# Patient Record
Sex: MALE | Race: WHITE | Employment: FULL TIME | URBAN - METROPOLITAN AREA
[De-identification: names, ages, dates, MRNs, and addresses within clinical notes are randomized per-mention and may not be internally consistent; named-entity substitution may affect disease eponyms.]

---

## 2017-03-09 ENCOUNTER — TELEPHONE (OUTPATIENT)
Dept: FAMILY MEDICINE | Facility: CLINIC | Age: 32
End: 2017-03-09

## 2017-03-09 NOTE — LETTER
March 27, 2017          Dane Lord  [address]  [facility] [address]            Dear Dane Lord,      At [facility] we care about your health and are committed to providing quality patient care. Regular appointments are a vital part of the care and management of your health and can help prevent many of the complications that can occur.      It has come to our attention that you are due for Diabetes check.  Please call [facility] at [phone] soon to schedule your follow up appointment.    If you have transferred care to another clinic please call to inform us so that we do not continue to send you reminder letters.      Sincerely,      [facility] Care Team/juana

## 2017-03-09 NOTE — TELEPHONE ENCOUNTER
Panel Management Review      Patient has the following on his problem list:     Diabetes    ASA: none listed     Last A1C  Lab Results   Component Value Date    A1C 13.1 03/15/2016    A1C 6.7 07/17/2014    A1C 7.2 03/04/2014     A1C tested: failed    Last LDL:    No results found for: CHOL  No results found for: HDL  No results found for: LDL  No results found for: TRIG  No results found for: CHOLHDLRATIO  No results found for: NHDL    Is the patient on a Statin? NO             Is the patient on Aspirin? NO        Last three blood pressure readings:  BP Readings from Last 3 Encounters:   03/15/16 129/83   03/04/14 109/74       Date of last diabetes office visit: 3/15/2016     Tobacco History:     History   Smoking Status     Never Smoker   Smokeless Tobacco     Never Used           Composite cancer screening  Chart review shows that this patient is due/due soon for the following None  Summary:    Patient is due/failing the following:   A1C    Action needed:   Patient needs office visit for Diabetes check.    Type of outreach:    Phone, spoke to patient.   , left message to call clinic for Diabetes check.    Questions for provider review:    None                                                                                                                                    Lanie Burris [facility]

## 2017-03-24 DIAGNOSIS — F31.70 BIPOLAR DISORDER IN REMISSION (H): ICD-10-CM

## 2017-03-24 NOTE — TELEPHONE ENCOUNTER
FLUoxetine (PROZAC) 20 MG capsule     Last Written Prescription Date: 3/15/16  Last Fill Quantity: 90, # refills: 3  Last Office Visit with [facility] primary care provider:  3/15/16        Last PHQ-9 score on record= No flowsheet data found.

## 2017-03-28 DIAGNOSIS — F31.70 BIPOLAR DISORDER IN REMISSION (H): ICD-10-CM

## 2017-03-28 NOTE — TELEPHONE ENCOUNTER
Medication is being filled for 1 time luis alberto refill only due to:  Patient due to be seen in clinic for provider visit.    Jason Archer RN

## 2017-03-28 NOTE — TELEPHONE ENCOUNTER
Filled 3/27/17.    Medication is being filled for 1 time luis alberto refill only due to:  Patient due to be seen in clinic for provider visit.          Gabriel Perez  [facility]

## 2017-03-31 NOTE — TELEPHONE ENCOUNTER
Prozac 20 mg     Last Written Prescription Date: 03/27/17  Last Fill Quantity: 30, # refills: 0  Last Office Visit with [facility] primary care provider:  03/15/16        Last PHQ-9 score on record= No flowsheet data found.            Medication was filled 03/27/17.  Request denied.      Abby Good RN

## 2017-09-20 ENCOUNTER — OFFICE VISIT (OUTPATIENT)
Dept: FAMILY MEDICINE | Facility: CLINIC | Age: 32
End: 2017-09-20
Payer: MEDICAID

## 2017-09-20 VITALS
DIASTOLIC BLOOD PRESSURE: 70 MMHG | WEIGHT: 209.6 LBS | HEART RATE: 68 BPM | HEIGHT: 69 IN | BODY MASS INDEX: 31.04 KG/M2 | TEMPERATURE: 97.5 F | OXYGEN SATURATION: 98 % | SYSTOLIC BLOOD PRESSURE: 106 MMHG

## 2017-09-20 DIAGNOSIS — E11.9 TYPE 2 DIABETES MELLITUS WITHOUT COMPLICATION, WITH LONG-TERM CURRENT USE OF INSULIN (H): ICD-10-CM

## 2017-09-20 DIAGNOSIS — F31.70 BIPOLAR DISORDER IN REMISSION (H): Primary | ICD-10-CM

## 2017-09-20 DIAGNOSIS — Z79.4 TYPE 2 DIABETES MELLITUS WITHOUT COMPLICATION, WITH LONG-TERM CURRENT USE OF INSULIN (H): ICD-10-CM

## 2017-09-20 DIAGNOSIS — Z23 ENCOUNTER FOR IMMUNIZATION: ICD-10-CM

## 2017-09-20 LAB
ALBUMIN SERPL-MCNC: 3.6 G/DL (ref 3.4–5)
ALP SERPL-CCNC: 61 U/L (ref 40–150)
ALT SERPL W P-5'-P-CCNC: 32 U/L (ref 0–70)
ANION GAP SERPL CALCULATED.3IONS-SCNC: 8 MMOL/L (ref 3–14)
AST SERPL W P-5'-P-CCNC: 12 U/L (ref 0–45)
BILIRUB SERPL-MCNC: 0.5 MG/DL (ref 0.2–1.3)
BUN SERPL-MCNC: 12 MG/DL (ref 7–30)
CALCIUM SERPL-MCNC: 8.3 MG/DL (ref 8.5–10.1)
CHLORIDE SERPL-SCNC: 100 MMOL/L (ref 94–109)
CO2 SERPL-SCNC: 27 MMOL/L (ref 20–32)
CREAT SERPL-MCNC: 1.43 MG/DL (ref 0.66–1.25)
CREAT UR-MCNC: 97 MG/DL
ERYTHROCYTE [DISTWIDTH] IN BLOOD BY AUTOMATED COUNT: 12.4 % (ref 10–15)
GFR SERPL CREATININE-BSD FRML MDRD: 57 ML/MIN/1.7M2
GLUCOSE SERPL-MCNC: 373 MG/DL (ref 70–99)
HBA1C MFR BLD: 11.6 % (ref 4.3–6)
HCT VFR BLD AUTO: 41.5 % (ref 40–53)
HGB BLD-MCNC: 14 G/DL (ref 13.3–17.7)
LDLC SERPL DIRECT ASSAY-MCNC: 84 MG/DL
LITHIUM SERPL-SCNC: 1 MMOL/L (ref 0.6–1.2)
MCH RBC QN AUTO: 31.4 PG (ref 26.5–33)
MCHC RBC AUTO-ENTMCNC: 33.7 G/DL (ref 31.5–36.5)
MCV RBC AUTO: 93 FL (ref 78–100)
MICROALBUMIN UR-MCNC: <5 MG/L
MICROALBUMIN/CREAT UR: NORMAL MG/G CR (ref 0–17)
PLATELET # BLD AUTO: 280 10E9/L (ref 150–450)
POTASSIUM SERPL-SCNC: 4.2 MMOL/L (ref 3.4–5.3)
PROT SERPL-MCNC: 6.7 G/DL (ref 6.8–8.8)
RBC # BLD AUTO: 4.46 10E12/L (ref 4.4–5.9)
SODIUM SERPL-SCNC: 135 MMOL/L (ref 133–144)
TSH SERPL DL<=0.005 MIU/L-ACNC: 0.54 MU/L (ref 0.4–4)
WBC # BLD AUTO: 10 10E9/L (ref 4–11)

## 2017-09-20 PROCEDURE — 80053 COMPREHEN METABOLIC PANEL: CPT | Performed by: FAMILY MEDICINE

## 2017-09-20 PROCEDURE — 84443 ASSAY THYROID STIM HORMONE: CPT | Performed by: FAMILY MEDICINE

## 2017-09-20 PROCEDURE — 83721 ASSAY OF BLOOD LIPOPROTEIN: CPT | Performed by: FAMILY MEDICINE

## 2017-09-20 PROCEDURE — 90471 IMMUNIZATION ADMIN: CPT | Performed by: FAMILY MEDICINE

## 2017-09-20 PROCEDURE — 83036 HEMOGLOBIN GLYCOSYLATED A1C: CPT | Performed by: FAMILY MEDICINE

## 2017-09-20 PROCEDURE — 82043 UR ALBUMIN QUANTITATIVE: CPT | Performed by: FAMILY MEDICINE

## 2017-09-20 PROCEDURE — 36415 COLL VENOUS BLD VENIPUNCTURE: CPT | Performed by: FAMILY MEDICINE

## 2017-09-20 PROCEDURE — 99213 OFFICE O/P EST LOW 20 MIN: CPT | Mod: 25 | Performed by: FAMILY MEDICINE

## 2017-09-20 PROCEDURE — 99207 C FOOT EXAM  NO CHARGE: CPT | Performed by: FAMILY MEDICINE

## 2017-09-20 PROCEDURE — 90686 IIV4 VACC NO PRSV 0.5 ML IM: CPT | Performed by: FAMILY MEDICINE

## 2017-09-20 PROCEDURE — 80178 ASSAY OF LITHIUM: CPT | Performed by: FAMILY MEDICINE

## 2017-09-20 PROCEDURE — 85027 COMPLETE CBC AUTOMATED: CPT | Performed by: FAMILY MEDICINE

## 2017-09-20 RX ORDER — LITHIUM CARBONATE 300 MG/1
TABLET, FILM COATED, EXTENDED RELEASE ORAL
Qty: 270 TABLET | Refills: 3 | Status: SHIPPED | OUTPATIENT
Start: 2017-09-20

## 2017-09-20 ASSESSMENT — PATIENT HEALTH QUESTIONNAIRE - PHQ9: SUM OF ALL RESPONSES TO PHQ QUESTIONS 1-9: 2

## 2017-09-20 NOTE — PATIENT INSTRUCTIONS
How to contact your care team providers:    [facility]  [phone]  Pharmacy [phone]    AMY Marino Dr., Dr., PA-C, Dr., PA-C    Team RN: Jason BARKER    Clinic hours  M-Th 7am-7pm   Fri 7am-5pm.   Urgent care M-F 11am-9pm,   Sat/sun 9am-5pm.  Pharmacy M-F 8:00am-8pm Sat/sun 9am-5pm.     All password changes, disabled accounts, or ID changes in [employer]/MyHealth will be done by our Access Services Department.   If you need help with your account or password, call: [phone]. Clinic staff no longer has the ability to change passwords.

## 2017-09-20 NOTE — NURSING NOTE
"Chief Complaint   Patient presents with     Depression       Initial /70 (BP Location: Left arm, Patient Position: Chair, Cuff Size: Adult Large)  Pulse 68  Temp 97.5  F (36.4  C) (Oral)  Ht 5' 9\" (1.753 m)  Wt 209 lb 9.6 oz (95.1 kg)  SpO2 98%  BMI 30.95 kg/m2 Estimated body mass index is 30.95 kg/(m^2) as calculated from the following:    Height as of this encounter: 5' 9\" (1.753 m).    Weight as of this encounter: 209 lb 9.6 oz (95.1 kg).  Medication Reconciliation: complete     Alona Bains MA    "

## 2017-09-20 NOTE — PROGRESS NOTES
"  SUBJECTIVE:   Dane Lord is a 31 year old male who presents to clinic today for the following health issues:      Depression Followup    Status since last visit: Stable     See PHQ-9 for current symptoms.  Other associated symptoms: None    Complicating factors:   Significant life event:  No   Current substance abuse:  None  Anxiety or Panic symptoms:  No    PHQ-9  English  PHQ-9   Any Language      Amount of exercise or physical activity: 3-4 days/week for an average of 15-30 minutes    Problems taking medications regularly: No    Medication side effects: none    Diet: regular (no restrictions)      Problem list and histories reviewed & adjusted, as indicated.  Additional history: as documented    Patient Active Problem List   Diagnosis     Bipolar disorder in remission (H)     Overweight (BMI 25.0-29.9)     Type 2 diabetes mellitus without complication, with long-term current use of insulin (H)     Past Surgical History:   Procedure Laterality Date     TONSILLECTOMY         Social History   Substance Use Topics     Smoking status: Never Smoker     Smokeless tobacco: Never Used     Alcohol use Yes     Family History   Problem Relation Age of Onset     DIABETES Mother              Reviewed and updated as needed this visit by clinical staffTobacco  Allergies  Meds  Med Hx  Surg Hx  Fam Hx  Soc Hx      Reviewed and updated as needed this visit by Provider         ROS:  Constitutional, HEENT, cardiovascular, pulmonary, GI, , musculoskeletal, neuro, skin, endocrine and psych systems are negative, except as otherwise noted.      OBJECTIVE:   /70 (BP Location: Left arm, Patient Position: Chair, Cuff Size: Adult Large)  Pulse 68  Temp 97.5  F (36.4  C) (Oral)  Ht 5' 9\" (1.753 m)  Wt 209 lb 9.6 oz (95.1 kg)  SpO2 98%  BMI 30.95 kg/m2  Body mass index is 30.95 kg/(m^2).  GENERAL: healthy, alert and no distress  NECK: no adenopathy, no asymmetry, masses, or scars and thyroid normal to " palpation  RESP: lungs clear to auscultation - no rales, rhonchi or wheezes  CV: regular rate and rhythm, normal S1 S2, no S3 or S4, no murmur, click or rub, no peripheral edema and peripheral pulses strong  ABDOMEN: soft, nontender, no hepatosplenomegaly, no masses and bowel sounds normal  MS: no gross musculoskeletal defects noted, no edema  Diabetic foot exam: normal DP and PT pulses, no trophic changes or ulcerative lesions and normal sensory exam    Diagnostic Test Results:  none     ASSESSMENT/PLAN:     1. Bipolar disorder in remission (H)  Stable. Continue with current medications. Check routine labs. RTC in 6 months for recheck.  - FLUoxetine (PROZAC) 20 MG capsule; TAKE 1 TABLET BY MOUTH DAILY  Dispense: 90 capsule; Refill: 1  - lithium (ESKALITH/LITHOBID) 300 MG CR tablet; TAKE 3 TABLETS BY MOUTH EVERY NIGHT AT BEDTIME  Dispense: 270 tablet; Refill: 3  - Comprehensive metabolic panel (BMP + Alb, Alk Phos, ALT, AST, Total. Bili, TP)  - TSH with free T4 reflex  - CBC with platelets  - Lithium level    2. Type 2 diabetes mellitus without complication, with long-term current use of insulin (H)  Check labs. Patient stated he is seeing an Endocrinologist.  - Comprehensive metabolic panel (BMP + Alb, Alk Phos, ALT, AST, Total. Bili, TP)  - TSH with free T4 reflex  - Hemoglobin A1c  - Albumin Random Urine Quantitative with Creat Ratio  - LDL cholesterol direct    3. Encounter for immunization    - C FLU VAC QUADRIVALENT SPLIT VIRUS 3+YRS IM    Work on weight loss  Regular exercise  See Patient Instructions    Dennis Mattson MD, MD  [facility]

## 2017-09-20 NOTE — MR AVS SNAPSHOT
After Visit Summary   9/20/2017    Dane Lord    MRN: [id number]           Patient Information     Date Of Birth          1985        Visit Information        Provider Department      9/20/2017 2:00 PM Dennis Mattson MD [facility]        Today's Diagnoses     Bipolar disorder in remission (H)    -  1    Type 2 diabetes mellitus without complication, with long-term current use of insulin (H)        Encounter for immunization          Care Instructions    How to contact your care team providers:    [facility]  [phone]  Pharmacy [phone]    AMY Marino Dr., Dr., PA-C, Dr., PA-C    Team RN: Jason FRANCIS and Litzy BARKER    Clinic hours  M-Th 7am-7pm   Fri 7am-5pm.   Urgent care M-F 11am-9pm,   Sat/sun 9am-5pm.  Pharmacy M-F 8:00am-8pm Sat/sun 9am-5pm.     All password changes, disabled accounts, or ID changes in [employer]/MyHealth will be done by our Access Services Department.   If you need help with your account or password, call: [phone]. Clinic staff no longer has the ability to change passwords.                         Follow-ups after your visit        Who to contact     If you have questions or need follow up information about today's clinic visit or your schedule please contact [facility] directly at [phone].  Normal or non-critical lab and imaging results will be communicated to you by MyChart, letter or phone within 4 business days after the clinic has received the results. If you do not hear from us within 7 days, please contact the clinic through MyChart or phone. If you have a critical or abnormal lab result, we will notify you by phone as soon as possible.  Submit refill requests through [employer] or call your pharmacy and they will forward the refill request to us.  "Please allow 3 business days for your refill to be completed.          Additional Information About Your Visit        MyChart Information     [employer] lets you send messages to your doctor, view your test results, renew your prescriptions, schedule appointments and more. To sign up, go to [email or web address] . Click on \"Log in\" on the left side of the screen, which will take you to the Welcome page. Then click on \"Sign up Now\" on the right side of the page.     You will be asked to enter the access code listed below, as well as some personal information. Please follow the directions to create your username and password.     Your access code is: [email or web address]  Expires: 2017  2:29 PM     Your access code will  in 90 days. If you need help or a new code, please call your [facility] clinic or [phone].        Care EveryWhere ID     This is your Care EveryWhere ID. This could be used by other organizations to access your [facility] medical records  [id number]        Your Vitals Were     Pulse Temperature Height Pulse Oximetry BMI (Body Mass Index)       68 97.5  F (36.4  C) (Oral) 5' 9\" (1.753 m) 98% 30.95 kg/m2        Blood Pressure from Last 3 Encounters:   17 106/70   03/15/16 129/83   14 109/74    Weight from Last 3 Encounters:   17 209 lb 9.6 oz (95.1 kg)   03/15/16 189 lb (85.7 kg)   14 190 lb (86.2 kg)              We Performed the Following     Albumin Random Urine Quantitative with Creat Ratio     C FLU VAC QUADRIVALENT SPLIT VIRUS 3+YRS IM     CBC with platelets     Comprehensive metabolic panel (BMP + Alb, Alk Phos, ALT, AST, Total. Bili, TP)     Hemoglobin A1c     LDL cholesterol direct     Lithium level     TSH with free T4 reflex          Today's Medication Changes          These changes are accurate as of: 17  2:30 PM.  If you have any questions, ask your nurse or doctor.               These medicines have changed or have updated prescriptions.        " Dose/Directions    FLUoxetine 20 MG capsule   Commonly known as:  PROzac   This may have changed:  See the new instructions.   Used for:  Bipolar disorder in remission (H)   Changed by:  Dennis Mattson MD        TAKE 1 TABLET BY MOUTH DAILY   Quantity:  90 capsule   Refills:  1       LANTUS SOLOSTAR 100 UNIT/ML injection   This may have changed:  See the new instructions.   Used for:  Uncontrolled type 1 diabetes mellitus without complication (H)   Generic drug:  insulin glargine        INJECT 20 UNITS SUBCUTANEOUSLY EVERY MORNING   Quantity:  15 mL   Refills:  1       lithium 300 MG CR tablet   Commonly known as:  ESKALITH/LITHOBID   This may have changed:  See the new instructions.   Used for:  Bipolar disorder in remission (H)   Changed by:  Dennis Mattson MD        TAKE 3 TABLETS BY MOUTH EVERY NIGHT AT BEDTIME   Quantity:  270 tablet   Refills:  3            Where to get your medicines      These medications were sent to [facility] [address] [address]     Phone:  [phone]     FLUoxetine 20 MG capsule    lithium 300 MG CR tablet                Primary Care Provider Office Phone # Fax #    Rafa Calero [phone]       [address]  [place] [address]        Equal Access to Services     KATIE [facility] : Hadii bhakti carmona hadasho Soomaali, waaxda luqadaha, qaybta kaalmada adeegyada, zelalem gant. So [facility] [phone].    ATENCIÓN: Si habla español, tiene a gomez disposición servicios gratuitos de asistencia lingüística. Llame al [phone].    We comply with applicable federal civil rights laws and [place] laws. We do not discriminate on the basis of race, color, national origin, age, disability sex, sexual orientation or gender identity.            Thank you!     Thank you for choosing [facility]  for your care. Our goal is always to provide you with excellent  care. Hearing back from our patients is one way we can continue to improve our services. Please take a few minutes to complete the written survey that you may receive in the mail after your visit with us. Thank you!             Your Updated Medication List - Protect others around you: Learn how to safely use, store and throw away your medicines at www.disposemymeds.org.          This list is accurate as of: 9/20/17  2:30 PM.  Always use your most recent med list.                   Brand Name Dispense Instructions for use Diagnosis    FLUoxetine 20 MG capsule    PROzac    90 capsule    TAKE 1 TABLET BY MOUTH DAILY    Bipolar disorder in remission (H)       LANTUS SOLOSTAR 100 UNIT/ML injection   Generic drug:  insulin glargine     15 mL    INJECT 20 UNITS SUBCUTANEOUSLY EVERY MORNING    Uncontrolled type 1 diabetes mellitus without complication (H)       lithium 300 MG CR tablet    ESKALITH/LITHOBID    270 tablet    TAKE 3 TABLETS BY MOUTH EVERY NIGHT AT BEDTIME    Bipolar disorder in remission (H)       metFORMIN 750 MG 24 hr tablet    GLUCOPHAGE-XR    90 tablet    TAKE 3 TABLETS BY MOUTH DAILY WITH BREAKFAST    Type 2 diabetes mellitus without complication, unspecified long term insulin use status (H)       NOVOLOG MIX 70/30 FLEXPEN SC           triamcinolone 0.1 % cream    KENALOG    453.6 g    Apply sparingly to affected area two times daily as needed.    Psoriasis

## 2017-09-20 NOTE — NURSING NOTE
Injectable Influenza Immunization Documentation      1.  Has the patient received the information for the injectable influenza vaccine? YES    2. Is the patient 6 months of age or older? YES    3. Does the patient have any of the following contraindications?          Severe allergy to eggs? No     Severe allergic reaction to previous influenza vaccines? No     Allergy to contact lens solution/thimerosol? No     History of Guillain-Jupiter syndrome? No     Currently have moderate or severe illness? No         4.  The vaccine has been administered and the patient was instructed to wait 15 minutes before leaving the building in the event of an allergic reaction: YES    Vaccination given by Alona Bains MA

## 2017-09-20 NOTE — LETTER
September 21, 2017      Dane Lord  [address]  [place] [address]        Dear ,    We are writing to inform you of your test results.    Your diabetes is not controlled. Please follow up with your endocrinologist to help manage the diabetes. Your kidney test was mildly elevated and abnormal. Have to control the diabetes better or the kidneys will worsen. Your blood count, cholesterol, thyroid and lithium level tests were normal.     Resulted Orders   Comprehensive metabolic panel (BMP + Alb, Alk Phos, ALT, AST, Total. Bili, TP)   Result Value Ref Range    Sodium 135 133 - 144 mmol/L    Potassium 4.2 3.4 - 5.3 mmol/L    Chloride 100 94 - 109 mmol/L    Carbon Dioxide 27 20 - 32 mmol/L    Anion Gap 8 3 - 14 mmol/L    Glucose 373 (H) 70 - 99 mg/dL    Urea Nitrogen 12 7 - 30 mg/dL    Creatinine 1.43 (H) 0.66 - 1.25 mg/dL    GFR Estimate 57 (L) >60 mL/min/1.7m2      Comment:      Non  GFR Calc    GFR Estimate If Black 69 >60 mL/min/1.7m2      Comment:       GFR Calc    Calcium 8.3 (L) 8.5 - 10.1 mg/dL    Bilirubin Total 0.5 0.2 - 1.3 mg/dL    Albumin 3.6 3.4 - 5.0 g/dL    Protein Total 6.7 (L) 6.8 - 8.8 g/dL    Alkaline Phosphatase 61 40 - 150 U/L    ALT 32 0 - 70 U/L    AST 12 0 - 45 U/L   TSH with free T4 reflex   Result Value Ref Range    TSH 0.54 0.40 - 4.00 mU/L   CBC with platelets   Result Value Ref Range    WBC 10.0 4.0 - 11.0 10e9/L    RBC Count 4.46 4.4 - 5.9 10e12/L    Hemoglobin 14.0 13.3 - 17.7 g/dL    Hematocrit 41.5 40.0 - 53.0 %    MCV 93 78 - 100 fl    MCH 31.4 26.5 - 33.0 pg    MCHC 33.7 31.5 - 36.5 g/dL    RDW 12.4 10.0 - 15.0 %    Platelet Count 280 150 - 450 10e9/L   Lithium level   Result Value Ref Range    Lithium Level 1.0 0.6 - 1.2 mmol/L   Hemoglobin A1c   Result Value Ref Range    Hemoglobin A1C 11.6 (H) 4.3 - 6.0 %   Albumin Random Urine Quantitative with Creat Ratio   Result Value Ref Range    Creatinine Urine 97 mg/dL    Albumin  Urine mg/L <5 mg/L    Albumin Urine mg/g Cr Unable to calculate due to low value 0 - 17 mg/g Cr   LDL cholesterol direct   Result Value Ref Range    LDL Cholesterol Direct 84 <100 mg/dL      Comment:      Desirable:       <100 mg/dl       If you have any questions or concerns, please call the clinic at the number listed above.       Sincerely,        Dennis Mattson MD, MD

## 2018-01-25 ENCOUNTER — TELEPHONE (OUTPATIENT)
Dept: FAMILY MEDICINE | Facility: CLINIC | Age: 33
End: 2018-01-25

## 2018-01-25 NOTE — TELEPHONE ENCOUNTER
Panel Management Review      BP Readings from Last 1 Encounters:   09/20/17 106/70      Last Office Visit with this department: 9/20/2017    Fail List measure: Diabetes follow up      Patient is due/failing the following:   A1c, BP CHECK and FOLLOW UP    Action needed:   Patient needs office visit for diabetes.    Type of outreach:    Phone, left message for patient to call back.     Questions for provider review:    None                                                                                                                                    Susie Holcomb, [facility]      Chart routed to Care Team.

## 2018-06-01 ENCOUNTER — TELEPHONE (OUTPATIENT)
Dept: FAMILY MEDICINE | Facility: CLINIC | Age: 33
End: 2018-06-01

## 2018-06-01 NOTE — LETTER
[address] [address]  Phone: [phone]    June 1, 2018    Dane Lord  [address]  [facility] [address]    Dear Dane,    I care about your health and have reviewed your health plan. I have reviewed your medical conditions, medication list, and lab results and am making recommendations based on this review, to better manage your health.    You are in particular need of attention regarding:  -Diabetes    I am recommending that you:  -schedule a FOLLOWUP OFFICE APPOINTMENT with me.  I will recheck your: A1c, Lipids (fasting cholesterol - nothing to eat except water and/or meds for 8+ hours), BMP (basic metabolic panel) and Microablumin tests.      Here is a list of Health Maintenance topics that are due now or due soon:  Health Maintenance Due   Topic Date Due     Eye Exam - yearly  12/02/1984     Pneumovax Vaccine  12/02/1985     HIV SCREEN (SYSTEM ASSIGNED)  12/02/2001     A1C (Diabetes) Lab - every 6 months  03/20/2018       Please call us at [phone] (or use [employer]) to address the above recommendations.     Thank you for trusting [facility] and we appreciate the opportunity to serve you.  We look forward to supporting your healthcare needs in the future.    Healthy Regards,    Dr. Craig Vocal